# Patient Record
Sex: MALE | Race: WHITE | Employment: FULL TIME | URBAN - METROPOLITAN AREA
[De-identification: names, ages, dates, MRNs, and addresses within clinical notes are randomized per-mention and may not be internally consistent; named-entity substitution may affect disease eponyms.]

---

## 2019-01-01 ENCOUNTER — HOSPITAL ENCOUNTER (OUTPATIENT)
Age: 44
Discharge: HOME | End: 2019-01-01
Attending: EMERGENCY MEDICINE
Payer: COMMERCIAL

## 2019-01-01 VITALS
WEIGHT: 215 LBS | OXYGEN SATURATION: 100 % | DIASTOLIC BLOOD PRESSURE: 79 MMHG | BODY MASS INDEX: 30.1 KG/M2 | HEIGHT: 71 IN | HEART RATE: 80 BPM | RESPIRATION RATE: 18 BRPM | SYSTOLIC BLOOD PRESSURE: 128 MMHG | TEMPERATURE: 98 F

## 2019-01-01 DIAGNOSIS — J02.0 STREP PHARYNGITIS: Primary | ICD-10-CM

## 2019-01-01 LAB — S PYO AG THROAT QL: POSITIVE

## 2019-01-01 PROCEDURE — 99202 OFFICE O/P NEW SF 15 MIN: CPT

## 2019-01-01 PROCEDURE — 99203 OFFICE O/P NEW LOW 30 MIN: CPT

## 2019-01-01 PROCEDURE — 87430 STREP A AG IA: CPT

## 2019-01-01 RX ORDER — AZITHROMYCIN 250 MG/1
TABLET, FILM COATED ORAL
Qty: 10 PACKAGE | Refills: 0 | Status: SHIPPED | OUTPATIENT
Start: 2019-01-01 | End: 2019-01-06

## 2019-01-01 NOTE — ED PROVIDER NOTES
Patient Seen in: [address]    History   Patient presents with:  Sore Throat    Stated Complaint: Sore throat, cold symptoms    HPI    Patient is complaining of 3 days of nasal congestion and a sore throat.   No history o nerve deficit. motor and sensory grossly normal, mood and affect normal   Skin: Skin is warm and dry. No rash noted. Nursing note and vitals reviewed.             ED Course     Labs Reviewed   [facility] POCT RAPID STREP - Abnormal; Notable for the following com

## 2024-12-22 ENCOUNTER — HOSPITAL ENCOUNTER (OUTPATIENT)
Age: 49
Discharge: HOME | End: 2024-12-22
Payer: COMMERCIAL

## 2024-12-22 VITALS
DIASTOLIC BLOOD PRESSURE: 90 MMHG | SYSTOLIC BLOOD PRESSURE: 136 MMHG | RESPIRATION RATE: 20 BRPM | HEART RATE: 91 BPM | TEMPERATURE: 98 F | OXYGEN SATURATION: 100 %

## 2024-12-22 DIAGNOSIS — R50.9 FEVER: ICD-10-CM

## 2024-12-22 DIAGNOSIS — J10.1 INFLUENZA A: Primary | ICD-10-CM

## 2024-12-22 DIAGNOSIS — Z20.822 ENCOUNTER FOR LABORATORY TESTING FOR COVID-19 VIRUS: ICD-10-CM

## 2024-12-22 LAB
POCT INFLUENZA A: POSITIVE
POCT INFLUENZA B: NEGATIVE
SARS-COV-2 RNA RESP QL NAA+PROBE: NOT DETECTED

## 2024-12-22 NOTE — ED PROVIDER NOTES
Patient Seen in: [facility]      History     Chief Complaint   Patient presents with    Cough/URI    Fever    Headache     Stated Complaint: COVID SYMPTOMS    Subjective:   HPI      Patient is a 49-year-old male who presents to immediate care due to cough x 5 days.  Associate symptoms include intermittent fever sinus congestion.  Patient does note that symptoms have been improving.    Objective:     Past Medical History:    Asthma (HCC)              History reviewed. No pertinent surgical history.             Social History     Socioeconomic History    Marital status:    Tobacco Use    Smoking status: Never    Smokeless tobacco: Never              Review of Systems    Positive for stated complaint: COVID SYMPTOMS  Other systems are as noted in HPI.  Constitutional and vital signs reviewed.      All other systems reviewed and negative except as noted above.    Physical Exam     ED Triage Vitals [12/22/24 1117]   /90   Pulse 91   Resp 20   Temp 98.3 °F (36.8 °C)   Temp src Oral   SpO2 100 %   O2 Device        Current Vitals:   Vital Signs  BP: 136/90  Pulse: 91  Resp: 20  Temp: 98.3 °F (36.8 °C)  Temp src: Oral    Oxygen Therapy  SpO2: 100 %        Physical Exam  Vital signs reviewed. Nursing note reviewed.  Constitutional: Well-developed. Well-nourished. In no acute distress  HENT: Mucous membranes moist. TMs intact bilaterally. No trismus. Uvula midline. Mild posterior pharynx erythema.  No petechiae, exudates, or posterior pharynx edema.  EYES: No scleral icterus or conjunctival injection.  NECK: Full ROM. Supple.   CARDIAC: Normal rate. Normal S1/ S2. 2+ distal pulses. No edema  PULM/CHEST: Clear to auscultation bilaterally. No wheezes  Extremities: Full ROM  NEURO: Awake, alert, following commands, moving extremities, answering questions.   SKIN: Warm and dry. No rash or lesions.  PSYCH: Normal judgment. Normal affect.        ED Course     Labs Reviewed   POCT FLU TEST - Abnormal;  Notable for the following components:       Result Value    POCT INFLUENZA A Positive (*)     All other components within normal limits    Narrative:     This assay is a rapid molecular in vitro test utilizing nucleic acid amplification of influenza A and B viral RNA.   RAPID SARS-COV-2 BY PCR - Normal                   MDM      Patient is a healthy 49-year-old male who presents to immediate care due to cough x 5 days.  Patient arrives with stable vitals speaking complete sentences in no respiratory distress.  Physical exam unremarkable with lungs clear to auscultation.  Ddx:  viral URI, acute cough, acute sinusitis.  Most likely influenza A as patient had rapid positive test today in immediate care less likely COVID-19,  as patient had rapid negative test today in immediate care.  Less likely bacterial sinusitis, pneumonia.  Discussed supportive treatment including Tylenol and ibuprofen as needed.  Antihistamine such as Claritin or Zyrtec and decongestant such as Sudafed.  Return precautions including worsening cough, fever chest pain shortness of breath.  History given by patient.  Patient agreeable to plan all questions answered.          Medical Decision Making      Disposition and Plan     Clinical Impression:  1. Influenza A    2. Encounter for laboratory testing for COVID-19 virus    3. Fever         Disposition:  Discharge  12/22/2024 11:49 am    Follow-up:  No follow-up provider specified.        Medications Prescribed:  There are no discharge medications for this patient.          Supplementary Documentation:

## (undated) NOTE — LETTER
Date & Time: 12/22/2024, 11:49 AM  Patient: Hernesto Quintero  Encounter Provider(s):    Aretha Francisco PA-C       To Whom It May Concern:    Hernesto Quintero was seen and treated in our department on 12/22/2024. He should not return to work until fever free for 24 hours .    If you have any questions or concerns, please do not hesitate to call.        _____________________________  Physician/APC Signature

## (undated) NOTE — LETTER
Date & Time: 1/1/2019, 2:42 PM  Patient: Mariajose Apodaca  Encounter Provider(s):    Andreia Elmore MD       To Whom It May Concern:    Mariajose Apodaca was seen and treated in our department on 1/1/2019. He can return to work 1/3/2019.     If you have any q